# Patient Record
(demographics unavailable — no encounter records)

---

## 2024-12-20 NOTE — DISCUSSION/SUMMARY
[Normal Growth] : growth [Normal Development] : development [None] : No known medical problems [No Elimination Concerns] : elimination [No Feeding Concerns] : feeding [No Skin Concerns] : skin [Normal Sleep Pattern] : sleep [Assessment of Language Development] : assessment of language development [Temperament and Behavior] : temperament and behavior [Toilet Training] : toilet training [TV Viewing] : tv viewing [Safety] : safety [No Medications] : ~He/She~ is not on any medications [Parent/Guardian] : parent/guardian [de-identified] : speech delay - f/u with Early Intervention  [FreeTextEntry3] : Labs drawn in office by MD Edison Mixon [] : The components of the vaccine(s) to be administered today are listed in the plan of care. The disease(s) for which the vaccine(s) are intended to prevent and the risks have been discussed with the caretaker.  The risks are also included in the appropriate vaccination information statements which have been provided to the patient's caregiver.  The caregiver has given consent to vaccinate.

## 2024-12-20 NOTE — DEVELOPMENTAL MILESTONES
[Plays alongside other children] : plays alongside other children [Takes off some clothing] : takes off some clothing [Uses 50 words] : does not use 50 words [Combine 2 words into phrase or] : does not combine 2 words into phrase or sentences [Follows 2-step command] : follows 2-step command [Uses words that are 50% intelligible] : uses words that are 50% intelligible to strangers [Kicks ball] : kicks ball  [Jumps off ground with 2 feet] : jumps off ground with 2 feet [Stacks objects] : stacks objects [Turns book pages] : turns book pages [FreeTextEntry1] : completed

## 2024-12-20 NOTE — HISTORY OF PRESENT ILLNESS
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

## 2024-12-20 NOTE — PHYSICAL EXAM
[Alert] : alert [No Acute Distress] : no acute distress [Normocephalic] : normocephalic [Anterior [place] Closed] : anterior fontanelle closed [Red Reflex Bilateral] : red reflex bilateral [PERRL] : PERRL [Normally Placed Ears] : normally placed ears [Auricles Well Formed] : auricles well formed [Clear Tympanic membranes with present light reflex and bony landmarks] : clear tympanic membranes with present light reflex and bony landmarks [No Discharge] : no discharge [Nares Patent] : nares patent [Palate Intact] : palate intact [Uvula Midline] : uvula midline [Tooth Eruption] : tooth eruption  [Supple, full passive range of motion] : supple, full passive range of motion [No Palpable Masses] : no palpable masses [Symmetric Chest Rise] : symmetric chest rise [Clear to Auscultation Bilaterally] : clear to auscultation bilaterally [Regular Rate and Rhythm] : regular rate and rhythm [S1, S2 present] : S1, S2 present [No Murmurs] : no murmurs [+2 Femoral Pulses] : +2 femoral pulses [Soft] : soft [NonTender] : non tender [Non Distended] : non distended [Normoactive Bowel Sounds] : normoactive bowel sounds [No Hepatomegaly] : no hepatomegaly [No Splenomegaly] : no splenomegaly [Giacomo 1] : Giacomo 1 [No Clitoromegaly] : no clitoromegaly [Normal Vaginal Introitus] : normal vaginal introitus [Normally Placed] : normally placed [No Abnormal Lymph Nodes Palpated] : no abnormal lymph nodes palpated [No Clavicular Crepitus] : no clavicular crepitus [Symmetric Buttocks Creases] : symmetric buttocks creases [No Spinal Dimple] : no spinal dimple [NoTuft of Hair] : no tuft of hair [Cranial Nerves Grossly Intact] : cranial nerves grossly intact [No Rash or Lesions] : no rash or lesions [Dermal Melanocytosis] : Dermal Melanocytosis [FreeTextEntry9] : small reducible umbilical hernia

## 2025-01-24 NOTE — DISCUSSION/SUMMARY
[FreeTextEntry1] : URI: Recommend supportive care including antipyretics, fluids, OTC cough/cold medications if age-appropriate, and nasal saline followed by nasal suction. Alarming breathing patterns such as retractions discussed and educational video reviewed, as applicable.   RIGHT Otitis Media:  antibiotics and start daily until complete. Continue supportive care.   RTC 1 week for Influenza vaccine  Return precautions reviewed. Patient to be brought to the ED if has persistent decreased oral intake, decrease in wet diapers, fever >100.4F or becomes patient becomes lethargic or changed in mental status and alertness. To note if fever > 5 days must be seen immediately either in clinic or in ED.   Caretaker expressed understanding of the plan and agrees. No other concerns or questions today.

## 2025-01-24 NOTE — HISTORY OF PRESENT ILLNESS
[FreeTextEntry6] : seen in ED last night for febrile seizure also with ROM, has not yet picked up abx with mild cough, congestion, runny nose as well RVP positive for RSV  no vomiting, diarrhea, sob or difficulty breathing, joint pain or swelling, rash, urinary symptoms, decreased wet diapers

## 2025-06-26 NOTE — PHYSICAL EXAM

## 2025-06-26 NOTE — HISTORY OF PRESENT ILLNESS
[Normal] : Normal [Brushing teeth] : Brushing teeth [Toothpaste] : Primary Fluoride Source: Toothpaste [In nursery school] : In nursery school [No] : Not at  exposure [Carbon Monoxide Detectors] : Carbon monoxide detectors [Smoke Detectors] : Smoke detectors [Up to date] : Up to date [NO] : No [FreeTextEntry7] : Doing well - no major hospitalizations or ED visits  [de-identified] : well balanced, no food allergies

## 2025-06-26 NOTE — DISCUSSION/SUMMARY
[] : The components of the vaccine(s) to be administered today are listed in the plan of care. The disease(s) for which the vaccine(s) are intended to prevent and the risks have been discussed with the caretaker.  The risks are also included in the appropriate vaccination information statements which have been provided to the patient's caregiver.  The caregiver has given consent to vaccinate. [FreeTextEntry1] : 30 mo F presenting for HCM.   Plan - Growth and development reviewed - Anticipatory guidance and routine care provided - RTC for 2yo HCM - Hep A #2 today - f/u with Early Intervention  Continue cow's milk. Continue table foods, 3 meals with 2-3 snacks per day. Incorporate fluorinated water daily in a sippy cup. Brush teeth twice a day with soft toothbrush. Recommend visit to dentist. When in car, keep child in rear-facing car seats until age 2, or until  the maximum height and weight for seat is reached. Put toddler to sleep in own bed. Help toddler to maintain consistent daily routines and sleep schedule. Toilet training discussed. Ensure home is safe. Use consistent, positive discipline. Read aloud to toddler. Limit screen time to no more than 2 hours per day.   Caretaker expressed understanding of the plan and agrees. No other concerns or questions today.